# Patient Record
Sex: FEMALE | Race: WHITE | NOT HISPANIC OR LATINO
[De-identification: names, ages, dates, MRNs, and addresses within clinical notes are randomized per-mention and may not be internally consistent; named-entity substitution may affect disease eponyms.]

---

## 2019-05-29 PROBLEM — Z00.00 ENCOUNTER FOR PREVENTIVE HEALTH EXAMINATION: Status: ACTIVE | Noted: 2019-05-29

## 2019-06-28 ENCOUNTER — APPOINTMENT (OUTPATIENT)
Dept: BARIATRICS | Facility: CLINIC | Age: 64
End: 2019-06-28
Payer: COMMERCIAL

## 2019-06-28 VITALS
BODY MASS INDEX: 37.11 KG/M2 | HEIGHT: 60 IN | DIASTOLIC BLOOD PRESSURE: 79 MMHG | HEART RATE: 80 BPM | WEIGHT: 189 LBS | SYSTOLIC BLOOD PRESSURE: 119 MMHG

## 2019-06-28 DIAGNOSIS — Z85.42 PERSONAL HISTORY OF MALIGNANT NEOPLASM OF OTHER PARTS OF UTERUS: ICD-10-CM

## 2019-06-28 DIAGNOSIS — Z82.49 FAMILY HISTORY OF ISCHEMIC HEART DISEASE AND OTHER DISEASES OF THE CIRCULATORY SYSTEM: ICD-10-CM

## 2019-06-28 DIAGNOSIS — Z87.891 PERSONAL HISTORY OF NICOTINE DEPENDENCE: ICD-10-CM

## 2019-06-28 DIAGNOSIS — Z87.898 PERSONAL HISTORY OF OTHER SPECIFIED CONDITIONS: ICD-10-CM

## 2019-06-28 DIAGNOSIS — E66.9 OBESITY, UNSPECIFIED: ICD-10-CM

## 2019-06-28 PROCEDURE — 99204 OFFICE O/P NEW MOD 45 MIN: CPT

## 2019-06-28 RX ORDER — TOLTERODINE TARTRATE 4 MG/1
4 CAPSULE, EXTENDED RELEASE ORAL
Refills: 0 | Status: ACTIVE | COMMUNITY

## 2019-06-28 NOTE — HISTORY OF PRESENT ILLNESS
[FreeTextEntry1] : 63 year old female for medical weight loss consultation.  \par Patient lost 110 pounds going to chiropractor who put her on a 500-800kcal day keto/paleo diet.  Patient then saw an RD who put her on a 4045-0187 kcal diet but patient is not compliant during "cheat days" and overeats which negates her progress.\par Highest adult weight 300 pounds\par Lowest adult weight 170 pounds\par Exercise- belongs to a gym- walks 3x a week\par Menopausal for about 7 years\par Always struggled with her weight since age 7.  Mother put her on a stimulant pill at age 11 which she couldn't tolerate\par Variable water intake\par Sleep- 7 hours a night\par Eating triggers- feels out of control and cannot stop when eating carbs\par Motivation- health, fit into clothing

## 2019-06-28 NOTE — ASSESSMENT
[FreeTextEntry1] : Patient will keep food log including her "cheat days" measure portions and bring to RD visit in 2 weeks\par Exercise goals increased to 4x a week 30 minute on treadmill walking and adding some weights\par Increase and be more consistent with water intake\par Discussed realistic goals for weight loss by using nutrition/exercise +/- medications\par Can consider Contrave, Belviq, Saxenda in the future- patient's preference is to start with nutrition and exercise\par Not a candidate and not interested in bariatric surgery\par

## 2019-07-23 ENCOUNTER — APPOINTMENT (OUTPATIENT)
Dept: BARIATRICS | Facility: CLINIC | Age: 64
End: 2019-07-23
Payer: COMMERCIAL

## 2019-07-23 PROCEDURE — 97802 MEDICAL NUTRITION INDIV IN: CPT

## 2019-11-12 ENCOUNTER — APPOINTMENT (OUTPATIENT)
Dept: BARIATRICS | Facility: CLINIC | Age: 64
End: 2019-11-12